# Patient Record
Sex: FEMALE | Race: WHITE
[De-identification: names, ages, dates, MRNs, and addresses within clinical notes are randomized per-mention and may not be internally consistent; named-entity substitution may affect disease eponyms.]

---

## 2018-10-16 ENCOUNTER — APPOINTMENT (OUTPATIENT)
Dept: PLASTIC SURGERY | Facility: CLINIC | Age: 23
End: 2018-10-16
Payer: SELF-PAY

## 2018-10-16 PROBLEM — Z00.00 ENCOUNTER FOR PREVENTIVE HEALTH EXAMINATION: Status: ACTIVE | Noted: 2018-10-16

## 2018-10-16 PROCEDURE — 11950 SUBQ NJX FILLING MATRL 1CC/<: CPT

## 2018-10-16 RX ORDER — VALACYCLOVIR 500 MG/1
500 TABLET, FILM COATED ORAL
Qty: 60 | Refills: 0 | Status: ACTIVE | COMMUNITY
Start: 2018-09-26

## 2018-10-30 ENCOUNTER — APPOINTMENT (OUTPATIENT)
Dept: PLASTIC SURGERY | Facility: CLINIC | Age: 23
End: 2018-10-30
Payer: SELF-PAY

## 2018-10-30 PROCEDURE — 99211 OFF/OP EST MAY X REQ PHY/QHP: CPT | Mod: NC

## 2019-01-08 ENCOUNTER — APPOINTMENT (OUTPATIENT)
Dept: PLASTIC SURGERY | Facility: CLINIC | Age: 24
End: 2019-01-08
Payer: SELF-PAY

## 2019-01-08 PROCEDURE — 11950 SUBQ NJX FILLING MATRL 1CC/<: CPT

## 2019-01-16 NOTE — PROCEDURE
[FreeTextEntry1] : l brow ptosis [FreeTextEntry2] : botox  [FreeTextEntry6] : PARESH CRENSHAW is complaining of dynamic rhytids of the face and desires botulinum toxin for temporary reduction in these rhytids.  The risks benefits alternatives limitations and permanent scars were outlined with her \par \par Please see the scanned face sheet for lot and dose information.\par

## 2019-01-16 NOTE — ASSESSMENT
[FreeTextEntry1] : PARESH CRENSHAW  was here for procedural Botox injection for unilateral brow ptosis .  She  tolerated the procedure well and will return for next dose in 4-5 months.  Instructions were reviewed.

## 2019-12-13 ENCOUNTER — APPOINTMENT (OUTPATIENT)
Dept: PLASTIC SURGERY | Facility: CLINIC | Age: 24
End: 2019-12-13
Payer: SELF-PAY

## 2019-12-13 PROCEDURE — 11950 SUBQ NJX FILLING MATRL 1CC/<: CPT

## 2020-06-11 ENCOUNTER — APPOINTMENT (OUTPATIENT)
Dept: PLASTIC SURGERY | Facility: CLINIC | Age: 25
End: 2020-06-11
Payer: SELF-PAY

## 2020-06-11 PROCEDURE — 11950 SUBQ NJX FILLING MATRL 1CC/<: CPT

## 2020-08-07 NOTE — PROCEDURE
[FreeTextEntry1] : brow ptosis l  [FreeTextEntry6] : PARESH CRENSHAW is complaining of dynamic rhytids of the face and desires botulinum toxin for temporary reduction in these rhytids.  The risks benefits alternatives limitations and permanent scars were outlined with her . Under aseptic conditions, Botulinum Toxin was administered in the desired area. Please see face sheet for lot , site and dose information. \par \par Please see the scanned face sheet for lot and dose information.\par

## 2020-08-07 NOTE — ASSESSMENT
[FreeTextEntry1] : PARESH CRENSHAW  was here for procedural Botox injection.  She  tolerated the procedure well and will return for next dose in 4-5 months.  Instructions were reviewed.\par

## 2020-11-06 ENCOUNTER — APPOINTMENT (OUTPATIENT)
Dept: PLASTIC SURGERY | Facility: CLINIC | Age: 25
End: 2020-11-06
Payer: SELF-PAY

## 2020-11-06 PROCEDURE — 11950 SUBQ NJX FILLING MATRL 1CC/<: CPT

## 2020-11-11 NOTE — PROCEDURE
[FreeTextEntry6] : PARESH CRENSHAW is complaining of dynamic rhytids of the face and desires botulinum toxin for temporary reduction in these rhytids.  The risks benefits alternatives limitations and permanent scars were outlined with her . Under aseptic conditions, Botulinum Toxin was administered in the desired area. Please see face sheet for lot , site and dose information. \par \par Please see the scanned face sheet for lot and dose information.\par

## 2021-02-11 ENCOUNTER — APPOINTMENT (OUTPATIENT)
Dept: PLASTIC SURGERY | Facility: CLINIC | Age: 26
End: 2021-02-11

## 2021-03-30 ENCOUNTER — APPOINTMENT (OUTPATIENT)
Dept: PLASTIC SURGERY | Facility: CLINIC | Age: 26
End: 2021-03-30
Payer: SELF-PAY

## 2021-03-30 PROCEDURE — 11950 SUBQ NJX FILLING MATRL 1CC/<: CPT

## 2021-05-24 NOTE — PROCEDURE
[FreeTextEntry6] : PARESH CRENSHAW is complaining of dynamic rhytids of the face and desires botulinum toxin for temporary reduction in these rhytids.  The risks benefits alternatives limitations and permanent scars were outlined with her . Under aseptic conditions, Botulinum Toxin was administered in the desired area. Please see face sheet for lot , site and dose information. \par \par Please see the scanned face sheet for lot and dose information. Aseptic administration of botox to indicated areas of the face.  See external sheet for lot and dose information

## 2021-05-24 NOTE — ASSESSMENT
[FreeTextEntry1] : PARESH CRENSHAW  was here for procedural Botox injection.  She  tolerated the procedure well and will return for next dose in 4-5 months.  Instructions were reviewed. \par

## 2021-08-12 ENCOUNTER — APPOINTMENT (OUTPATIENT)
Dept: PLASTIC SURGERY | Facility: CLINIC | Age: 26
End: 2021-08-12
Payer: SELF-PAY

## 2021-08-12 PROCEDURE — 11950 SUBQ NJX FILLING MATRL 1CC/<: CPT

## 2023-02-14 ENCOUNTER — APPOINTMENT (OUTPATIENT)
Dept: PLASTIC SURGERY | Facility: CLINIC | Age: 28
End: 2023-02-14
Payer: SELF-PAY

## 2023-02-14 DIAGNOSIS — H57.812 BROW PTOSIS, LEFT: ICD-10-CM

## 2023-02-14 PROCEDURE — 64612 DESTROY NERVE FACE MUSCLE: CPT

## 2023-02-15 PROBLEM — H57.812 BROW PTOSIS, LEFT: Status: ACTIVE | Noted: 2018-10-16

## 2023-02-15 NOTE — ASSESSMENT
[FreeTextEntry1] : PARESH CRENSHAW  was here for procedural Botox injection.  She  tolerated the procedure well and will return for next dose in 4-5 months.  Instructions were reviewed.